# Patient Record
Sex: MALE | Race: WHITE
[De-identification: names, ages, dates, MRNs, and addresses within clinical notes are randomized per-mention and may not be internally consistent; named-entity substitution may affect disease eponyms.]

---

## 2020-07-13 ENCOUNTER — HOSPITAL ENCOUNTER (EMERGENCY)
Dept: HOSPITAL 11 - JP.ED | Age: 75
Discharge: HOME | End: 2020-07-13
Payer: MEDICARE

## 2020-07-13 DIAGNOSIS — R11.2: Primary | ICD-10-CM

## 2020-07-13 DIAGNOSIS — Z79.899: ICD-10-CM

## 2020-07-13 PROCEDURE — 36415 COLL VENOUS BLD VENIPUNCTURE: CPT

## 2020-07-13 PROCEDURE — 99284 EMERGENCY DEPT VISIT MOD MDM: CPT

## 2020-07-13 PROCEDURE — 80053 COMPREHEN METABOLIC PANEL: CPT

## 2020-07-13 PROCEDURE — 84484 ASSAY OF TROPONIN QUANT: CPT

## 2020-07-13 PROCEDURE — 85025 COMPLETE CBC W/AUTO DIFF WBC: CPT

## 2020-07-13 NOTE — EDM.PDOC
ED HPI GENERAL MEDICAL PROBLEM





- General


Chief Complaint: General


Stated Complaint: FEVER


Time Seen by Provider: 07/13/20 18:00


Source of Information: Reports: Patient, Family


History Limitations: Reports: No Limitations





- History of Present Illness


INITIAL COMMENTS - FREE TEXT/NARRATIVE: 





74-year-old male presents to the emergency room feeling ill for the last 5 

hours.  He felt fine this morning, went to work, did not feel like he got 

overheated but by noon he was starting to feel off and somewhat weak.  Shortly 

after noon he became more nauseated, lightheaded, and felt he needed to go home.

 He also had an episode of diaphoresis but no chills.  Over the next 3 hours he 

had 3 emesis, very rancid but no bleeding and no pain.  He has had no diarrhea. 

His daughter then checked his temperature and it was "almost 104" so she gave 

him 2 aspirin and brought him in.  He now feels fairly good lying down, his 

temperature is normal and vitals are normal.  Other than some shoulder and neck 

pain earlier today which is intermittent and chronic, he has had no pain.  

Denies shortness of breath, cough, abdominal pain.  No history of any surgery, 

he is very healthy for his age and still works full-time.  He denies any tick 

exposure, he is a  and has greasy oily skin and they do not bite him.


Onset: Sudden (Symptoms started fairly suddenly about 6 hours ago)


Associated Symptoms: Reports: Diaphoresis, Fever/Chills, Malaise, 

Nausea/Vomiting, Weakness.  Denies: Confusion, Chest Pain, Cough, Headaches, 

Shortness of Breath


  ** Bilateral Shoulder


Pain Score (Numeric/FACES): 7





- Related Data


                                    Allergies











Allergy/AdvReac Type Severity Reaction Status Date / Time


 


No Known Allergies Allergy   Verified 07/13/20 17:57











Home Meds: 


                                    Home Meds





Allopurinol [Zyloprim] 500 mg PO DAILY 07/13/20 [History]


Losartan [Cozaar] 12.5 mg PO DAILY 07/13/20 [History]











Past Medical History


HEENT History: Reports: Impaired Vision


Gastrointestinal History: Reports: Irritable Bowel Syndrome


Hematologic History: Reports: Other (See Below)


Other Hematologic History: cll





- Infectious Disease History


Infectious Disease History: Reports: Chicken Pox, Measles, Mumps





Social & Family History





- Tobacco Use


Smoking Status *Q: Never Smoker





- Caffeine Use


Caffeine Use: Reports: Coffee





- Recreational Drug Use


Recreational Drug Use: No





ED ROS GENERAL





- Review of Systems


Review Of Systems: See Below


Constitutional: Reports: Fever, Malaise, Decreased Appetite


HEENT: Reports: No Symptoms


Respiratory: Denies: Shortness of Breath, Pleuritic Chest Pain, Cough


Cardiovascular: Reports: Lightheadedness.  Denies: Chest Pain, Dyspnea on 

Exertion, Palpitations


Endocrine: Denies: Fatigue


GI/Abdominal: Reports: Nausea, Vomiting.  Denies: Abdominal Pain, Diarrhea


Musculoskeletal: Reports: Neck Pain, Shoulder Pain (Bilateral)


Skin: Reports: Pallor, Diaphoresis


Neurological: Reports: Dizziness, Weakness.  Denies: Confusion, Headache, 

Paresthesia


Psychiatric: Reports: No Symptoms





ED EXAM, GENERAL





- Physical Exam


Exam: See Below


Exam Limited By: No Limitations


General Appearance: Alert, No Apparent Distress


Eye Exam: Bilateral Eye: Normal Inspection


Throat/Mouth: Normal Inspection


Head: Atraumatic


Neck: Normal Inspection, Supple


Respiratory/Chest: No Respiratory Distress, Lungs Clear


Cardiovascular: Regular Rate, Rhythm, Systolic Murmur (Fairly pronounced 

systolic murmur which has been there his whole life)


GI/Abdominal: Normal Bowel Sounds, Soft, Non-Tender


Extremities: Normal Inspection.  No: Pedal Edema


Neurological: Alert, Oriented, No Motor/Sensory Deficits


Psychiatric: Normal Affect, Normal Mood


Skin Exam: Warm, Dry





Course





- Vital Signs


Last Recorded V/S: 


                                Last Vital Signs











Temp  98.4 F   07/13/20 19:36


 


Pulse  99   07/13/20 17:56


 


Resp  20   07/13/20 17:56


 


BP  118/79   07/13/20 17:56


 


Pulse Ox  92 L  07/13/20 17:56














- Orders/Labs/Meds


Labs: 


                                Laboratory Tests











  07/13/20 07/13/20 Range/Units





  18:15 18:20 


 


WBC   8.0  (4.5-11.0)  K/uL


 


RBC   4.91  (4.30-5.90)  M/uL


 


Hgb   15.5 H  (12.0-15.0)  g/dL


 


Hct   46.1  (40.0-54.0)  %


 


MCV   94  (80-98)  fL


 


MCH   32 H  (27-31)  pg


 


MCHC   34  (32-36)  %


 


Plt Count   181  (150-400)  K/uL


 


Neut % (Auto)   75 H  (36-66)  %


 


Lymph % (Auto)   12 L  (24-44)  %


 


Mono % (Auto)   13 H  (2-6)  %


 


Eos % (Auto)   0 L  (2-4)  %


 


Baso % (Auto)   0  (0-1)  %


 


Sodium  140   (140-148)  mmol/L


 


Potassium  3.6   (3.6-5.2)  mmol/L


 


Chloride  103   (100-108)  mmol/L


 


Carbon Dioxide  24   (21-32)  mmol/L


 


Anion Gap  13.0   (5.0-14.0)  mmol/L


 


BUN  14   (7-18)  mg/dL


 


Creatinine  1.0   (0.8-1.3)  mg/dL


 


Est Cr Clr Drug Dosing  67.93   mL/min


 


Estimated GFR (MDRD)  > 60   (>60)  


 


Glucose  194 H   ()  mg/dL


 


Calcium  8.4 L   (8.5-10.1)  mg/dL


 


Total Bilirubin  0.7   (0.2-1.0)  mg/dL


 


AST  48 H   (15-37)  U/L


 


ALT  43   (12-78)  U/L


 


Alkaline Phosphatase  89   ()  U/L


 


Troponin I  0.074 H*   (0.000-0.056)  ng/mL


 


Total Protein  6.6   (6.4-8.2)  g/dL


 


Albumin  3.3 L   (3.4-5.0)  g/dL


 


Globulin  3.3   (2.3-3.5)  g/dL


 


Albumin/Globulin Ratio  1.0 L   (1.2-2.2)  











Meds: 


Medications














Discontinued Medications














Generic Name Dose Route Start Last Admin





  Trade Name Freq  PRN Reason Stop Dose Admin


 


Ondansetron HCl  4 mg  07/13/20 18:10  07/13/20 18:22





  Zofran Odt  PO  07/13/20 18:11  4 mg





  ONETIME ONE   Administration














- Re-Assessments/Exams


Free Text/Narrative Re-Assessment/Exam: 





07/13/20 18:16


Patient will be given 4 mg of sublingual Zofran, CBC CMP and troponin obtained. 

 Patient is hydrated, IV fluids not necessary at this time.


07/13/20 19:14


Patient was asymptomatic while in the emergency room, his troponin returned 

0.074 which is just barely above the upper limits of normal.  This should have 

been significantly higher if it was infarction as his symptoms started 5 to 6 

hours ago.  After the remainder of his labs returned, the patient ambulated 

around the halls without any difficulty and felt fine.  He was given 5 

additional doses of Zofran and will return tomorrow if not improving 

satisfactorily.





Departure





- Departure


Time of Disposition: 19:39


Disposition: Home, Self-Care 01


Clinical Impression: 


Nausea and vomiting


Qualifiers:


 Vomiting type: unspecified Vomiting Intractability: unspecified Qualified 

Code(s): R11.2 - Nausea with vomiting, unspecified








- Discharge Information


Instructions:  Nausea and Vomiting, Adult, Easy-to-Read


Referrals: 


Finesse Eubanks NP [Primary Care Provider] - 


Forms:  ED Department Discharge


Care Plan Goals: 


Use Zofran every 6-8 hours if needed for nausea vomiting, return anytime if 

worsening such as persistent chest pain, shortness of breath, recurring fevers 

or chills or other concerns.





Sepsis Event Note (ED)





- Evaluation


Sepsis Screening Result: Possible Sepsis Risk





- Focused Exam


Vital Signs: 


                                   Vital Signs











  Temp Pulse Resp BP Pulse Ox


 


 07/13/20 19:36  98.4 F    


 


 07/13/20 17:56  99.6 F  99  20  118/79  92 L


 


 07/13/20 17:52  99.6 F  99  20  118/79  92 L

## 2020-07-15 ENCOUNTER — HOSPITAL ENCOUNTER (EMERGENCY)
Dept: HOSPITAL 11 - JP.ED | Age: 75
Discharge: OTHER INSTITUTION | End: 2020-07-15
Payer: OTHER GOVERNMENT

## 2020-07-15 DIAGNOSIS — I21.3: Primary | ICD-10-CM

## 2020-07-15 DIAGNOSIS — Z79.899: ICD-10-CM

## 2020-07-15 PROCEDURE — 96376 TX/PRO/DX INJ SAME DRUG ADON: CPT

## 2020-07-15 PROCEDURE — 83605 ASSAY OF LACTIC ACID: CPT

## 2020-07-15 PROCEDURE — 99291 CRITICAL CARE FIRST HOUR: CPT

## 2020-07-15 PROCEDURE — 84484 ASSAY OF TROPONIN QUANT: CPT

## 2020-07-15 PROCEDURE — 71045 X-RAY EXAM CHEST 1 VIEW: CPT

## 2020-07-15 PROCEDURE — 96374 THER/PROPH/DIAG INJ IV PUSH: CPT

## 2020-07-15 PROCEDURE — 80053 COMPREHEN METABOLIC PANEL: CPT

## 2020-07-15 PROCEDURE — 93005 ELECTROCARDIOGRAM TRACING: CPT

## 2020-07-15 PROCEDURE — 36415 COLL VENOUS BLD VENIPUNCTURE: CPT

## 2020-07-15 PROCEDURE — 87040 BLOOD CULTURE FOR BACTERIA: CPT

## 2020-07-15 PROCEDURE — 85025 COMPLETE CBC W/AUTO DIFF WBC: CPT

## 2020-07-15 PROCEDURE — 93010 ELECTROCARDIOGRAM REPORT: CPT

## 2020-07-15 RX ADMIN — SODIUM CHLORIDE ONE UNITS: 0.9 INJECTION, SOLUTION INTRAVENOUS at 00:37

## 2020-07-15 RX ADMIN — HEPARIN SODIUM AND DEXTROSE ONE: 5000; 5 INJECTION INTRAVENOUS at 01:17

## 2020-07-15 NOTE — CR
CHEST: Portable 7/15/2020 at 1:08 AM

 

CLINICAL HISTORY:Tachycardia, fevers

 

COMPARISON:None

 

FINDINGS:  The heart size, pulmonary vascularity and hilar structures are

normal. No infiltrate effusion or pneumothorax is seen. There are

atherosclerotic changes in the aorta.

 

IMPRESSION: No acute cardiopulmonary process.

## 2020-07-15 NOTE — EDM.PDOC
ED HPI GENERAL MEDICAL PROBLEM





- General


Chief Complaint: Chest Pain


Stated Complaint: REVIST ILL


Time Seen by Provider: 07/15/20 00:15


Source of Information: Reports: Patient


History Limitations: Reports: No Limitations





- History of Present Illness


INITIAL COMMENTS - FREE TEXT/NARRATIVE: 





74-year-old male who developed upper left chest pain at 6 PM this evening, with 

diaphoresis, chills, and nausea.  He felt like his heart was racing so came up 

to the hospital.  He was seen in the emergency room yesterday with nausea and 

vomiting and a fever spike, but no shortness of breath or chest pain and 

responded well to fluids and antinausea medicine.  He did have a troponin that 

was borderline elevated yesterday but no other findings.  I did call him at home

today and he said he felt well, had a good day but was a little tired.  However 

about 2 hours after the phone call he developed fairly sudden upper left chest 

discomfort and neck discomfort and could not get comfortable.  This was a new 

symptom from yesterday.  He then felt like his heart was racing and he became 

very diaphoretic so they transported him urgently to the emergency room by 

private car.  On arrival he was hypotensive, tachycardic, diaphoretic and 

complaining of upper left chest and neck discomfort.  No nausea and vomiting.


Onset: Sudden (Symptoms started fairly suddenly at 6 PM)


Location: Reports: Neck, Chest


Associated Symptoms: Reports: Chest Pain, Diaphoresis, Malaise, Weakness.  

Denies: Fever/Chills, Nausea/Vomiting, Shortness of Breath


  ** Left Shoulder


Pain Score (Numeric/FACES): 10





  ** chest pain


Pain Score (Numeric/FACES): 7





- Related Data


                                    Allergies











Allergy/AdvReac Type Severity Reaction Status Date / Time


 


No Known Allergies Allergy   Verified 07/15/20 00:26











Home Meds: 


                                    Home Meds





Allopurinol [Zyloprim] 500 mg PO DAILY 07/13/20 [History]


Losartan [Cozaar] 12.5 mg PO DAILY 07/13/20 [History]











Past Medical History


HEENT History: Reports: Impaired Vision


Gastrointestinal History: Reports: Irritable Bowel Syndrome


Hematologic History: Reports: Other (See Below)


Other Hematologic History: cll





- Infectious Disease History


Infectious Disease History: Reports: Chicken Pox, Measles, Mumps





Social & Family History





- Caffeine Use


Caffeine Use: Reports: Coffee





ED ROS GENERAL





- Review of Systems


Review Of Systems: See Below


Constitutional: Reports: Chills, Malaise


HEENT: Reports: No Symptoms


Respiratory: Denies: Shortness of Breath, Pleuritic Chest Pain, Cough


Cardiovascular: Reports: Chest Pain (Upper left chest pain), Palpitations


GI/Abdominal: Reports: No Symptoms


Skin: Reports: Pallor, Diaphoresis


Neurological: Reports: Dizziness, Weakness.  Denies: Headache


Psychiatric: Reports: No Symptoms





ED EXAM, GENERAL





- Physical Exam


Exam: See Below


Exam Limited By: No Limitations


General Appearance: Alert, Moderate Distress (Patient looks very uncomfortable, 

has active upper chest discomfort and diaphoresis)


Head: Atraumatic


Neck: Supple, Non-Tender


Respiratory/Chest: Lungs Clear


Cardiovascular: Regular Rate, Rhythm, Tachycardia


GI/Abdominal: Soft, Non-Tender


Extremities: Normal Inspection.  No: Pedal Edema


Neurological: Alert, Oriented


Psychiatric: Normal Affect, Normal Mood


Skin Exam: Diaphoretic





EKG INTERPRETATION


Rhythm: NSR


Rate (Beats/Min): 142


ST-T: Elevated (Slightly ST elevation in the inferior leads, reciprocal changes 

in the anterior leads)





Course





- Vital Signs


Last Recorded V/S: 


                                Last Vital Signs











Temp  96.7 F L  07/15/20 01:11


 


Pulse  144 H  07/15/20 01:11


 


Resp  11 L  07/15/20 01:11


 


BP  81/55 L  07/15/20 01:11


 


Pulse Ox  95   07/15/20 01:11














- Orders/Labs/Meds


Orders: 


                               Active Orders 24 hr











 Category Date Time Status


 


 EKG Documentation Completion [RC] ASDIRECTED Care  07/15/20 00:26 Active


 


 Chest 1V Frontal [CR] Stat Exams  07/15/20 00:25 Taken


 


 CULTURE BLOOD [BC] Urgent Lab  07/15/20 00:30 Received


 


 CULTURE BLOOD [BC] Urgent Lab  07/15/20 00:37 Received


 


 Blood Culture x2 Reflex Set [OM.PC] Urgent Oth  07/15/20 00:25 Ordered


 


 EKG 12 Lead [EK] Routine Ther  07/15/20 00:25 Ordered











Labs: 


                                Laboratory Tests











  07/15/20 07/15/20 07/15/20 Range/Units





  00:37 00:37 00:37 


 


WBC  15.0 H    (4.5-11.0)  K/uL


 


RBC  4.93    (4.30-5.90)  M/uL


 


Hgb  15.7 H    (12.0-15.0)  g/dL


 


Hct  46.2    (40.0-54.0)  %


 


MCV  94    (80-98)  fL


 


MCH  32 H    (27-31)  pg


 


MCHC  34    (32-36)  %


 


Plt Count  178    (150-400)  K/uL


 


Neut % (Auto)  73 H    (36-66)  %


 


Lymph % (Auto)  14 L    (24-44)  %


 


Mono % (Auto)  12 H    (2-6)  %


 


Eos % (Auto)  0 L    (2-4)  %


 


Baso % (Auto)  0    (0-1)  %


 


Sodium   139 L   (140-148)  mmol/L


 


Potassium   3.6   (3.6-5.2)  mmol/L


 


Chloride   101   (100-108)  mmol/L


 


Carbon Dioxide   25   (21-32)  mmol/L


 


Anion Gap   16.6 H   (5.0-14.0)  mmol/L


 


BUN   19 H   (7-18)  mg/dL


 


Creatinine   1.4 H   (0.8-1.3)  mg/dL


 


Est Cr Clr Drug Dosing   48.52   mL/min


 


Estimated GFR (MDRD)   50 L   (>60)  


 


Glucose   228 H   ()  mg/dL


 


Lactic Acid    2.5 H  (0.4-2.0)  mmol/L


 


Calcium   8.7   (8.5-10.1)  mg/dL


 


Total Bilirubin   1.2 H D   (0.2-1.0)  mg/dL


 


AST   73 H   (15-37)  U/L


 


ALT   40   (12-78)  U/L


 


Alkaline Phosphatase   94   ()  U/L


 


Troponin I   2.417 H*   (0.000-0.056)  ng/mL


 


Total Protein   6.6   (6.4-8.2)  g/dL


 


Albumin   3.2 L   (3.4-5.0)  g/dL


 


Globulin   3.4   (2.3-3.5)  g/dL


 


Albumin/Globulin Ratio   0.9 L   (1.2-2.2)  











Meds: 


Medications














Discontinued Medications














Generic Name Dose Route Start Last Admin





  Trade Name Freq  PRN Reason Stop Dose Admin


 


Aspirin  324 mg  07/15/20 00:30  07/15/20 00:35





  Aspirin  PO  07/15/20 00:31  324 mg





  ONETIME ONE   Administration


 


Heparin Sodium (Porcine)  5,000 units  07/15/20 00:31  07/15/20 00:37





  Heparin Sodium  IVPUSH  07/15/20 00:32  5,000 units





  ONETIME ONE   Administration


 


Nitroglycerin/Dextrose  25 mg in 250 mls @ 6 mls/hr  07/15/20 00:45 





  Nitroglycerin  25 Mg/D5w 250 Ml  IV  





  TITRATE TRI  





  Protocol  





  10 MCG/MIN  


 


Sodium Chloride  1,000 mls @ 1,000 mls/hr  07/15/20 01:00  07/15/20 00:50





  Normal Saline  IV   1,000 mls/hr





  ASDIRECTED TRI   Administration


 


Sodium Chloride  1,000 mls @ 1,000 mls/hr  07/15/20 01:00  07/15/20 00:30





  Normal Saline  IV   1,000 mls/hr





  ASDIRECTED TRI   Administration


 


Heparin Sodium/Dextrose  25,000 units in 500 mls @ 20 mls/hr  07/15/20 01:15  

07/15/20 01:16





  Heparin 25,000 Units In D5w 500 Ml  IV   1,000 units/hr





  TITRATE TRI   20 mls/hr





    Administration





  Protocol  





  1,000 UNITS/HR  


 


Heparin Sodium/Dextrose  Confirm  07/15/20 01:10  07/15/20 01:17





  Heparin 25,000 Units In D5w 500 Ml  Administered  07/15/20 01:11  Not Given





  Dose  





  500 mls @ as directed  





  .ROUTE  





  .STK-MED ONE  


 


Morphine Sulfate  4 mg  07/15/20 00:30  07/15/20 01:33





  Morphine  IVPUSH  07/15/20 00:31  Not Given





  ONETIME ONE  


 


Ticagrelor  180 mg  07/15/20 00:40  07/15/20 01:13





  Brilinta  PO  07/15/20 00:41  180 mg





  ONETIME ONE   Administration














- Re-Assessments/Exams


Free Text/Narrative Re-Assessment/Exam: 





07/15/20 01:04


EKG shows some possible ischemic changes, and with his symptoms it was 

approached as a STEMI.  He was given 324 mg of chewable aspirin, 2 IVs were 

started and nitroglycerin drip was ordered.  Also 4 mg of IV morphine was 

ordered.  However prior to the nitro and morphine being given, the patient 

became hypotensive so it was held.   He was also given a 5000 unit heparin 

bolus, 180 mg of Brilinta, and cardiology consultation was obtained.  CBC CMP 

troponin, and a 1 view chest x-ray and blood cultures were obtained.  Urgent 

transfer by air to [facility] in [place] was arranged.  Dr. Aguero, accepted the 

patient at 12:45 AM.


Heparin at 1000 units an hour was started just prior to discharge.  Critical 

care time was 1 hour.





07/15/20 01:17


Troponin returned at 2.417.  White count was 15,000, it was normal yesterday.





Departure





- Departure


Time of Disposition: 01:34


Disposition: DC/Tfer to Other 70


Clinical Impression: 


ST elevation (STEMI) myocardial infarction


Qualifiers:


 Involved coronary artery: unspecified coronary artery Qualified Code(s): I21.3 

- ST elevation (STEMI) myocardial infarction of unspecified site








- Discharge Information


Referrals: 


Finesse Eubanks NP [Primary Care Provider] - 


Forms:  ED Department Discharge


Care Plan Goals: 


Patient was urgently transferred by air to [facility] for 

cardiology evaluation and acute coronary intervention for STEMI.





Critical Care Note





- Critical Care Note


Total Time (mins): 60





Sepsis Event Note (ED)





- Focused Exam


Vital Signs: 


                                   Vital Signs











  Temp Pulse Resp BP Pulse Ox


 


 07/15/20 01:11  96.7 F L  144 H  11 L  81/55 L  95


 


 07/15/20 01:08   142 H  11 L  76/54 L  93 L


 


 07/15/20 00:57   142 H  12  81/50 L  93 L


 


 07/15/20 00:51  96.1 F L  142 H  12  140/115 H  96


 


 07/15/20 00:48  96.6 F L  143 H  9 L  81/48 L  91 L


 


 07/15/20 00:41   140 H  11 L  74/29 L  95


 


 07/15/20 00:40   141 H  9 L  50/34 L  92 L


 


 07/15/20 00:39   140 H  9 L  62/42 L  92 L


 


 07/15/20 00:15  96.1 F L  142 H  12  140/115 H  96














- My Orders


Last 24 Hours: 


My Active Orders





07/15/20 00:25


Chest 1V Frontal [CR] Stat 


Blood Culture x2 Reflex Set [OM.PC] Urgent 


EKG 12 Lead [EK] Routine 





07/15/20 00:26


EKG Documentation Completion [RC] ASDIRECTED 





07/15/20 00:30


CULTURE BLOOD [BC] Urgent 





07/15/20 00:37


CULTURE BLOOD [BC] Urgent 














- Assessment/Plan


Last 24 Hours: 


My Active Orders





07/15/20 00:25


Chest 1V Frontal [CR] Stat 


Blood Culture x2 Reflex Set [OM.PC] Urgent 


EKG 12 Lead [EK] Routine 





07/15/20 00:26


EKG Documentation Completion [RC] ASDIRECTED 





07/15/20 00:30


CULTURE BLOOD [BC] Urgent 





07/15/20 00:37


CULTURE BLOOD [BC] Urgent